# Patient Record
(demographics unavailable — no encounter records)

---

## 2025-06-13 NOTE — ASSESSMENT
[FreeTextEntry1] : Mr. Orta is a 44-year-old who presents with subacute progressive predominantly motor symptoms affecting the distal upper and lower extremities and very severe proximal lower extremities.  There is a paucity of sensory findings and complaints.  He is areflexic.  His clinical presentation, neurologic examination and enhancement of the distal conus and ventral lumbar nerve root are consistent with an immune mediated polyneuropathy.  Review of his progressive symptoms over several months, this is more likely a chronic and acute inflammatory polyneuropathy.  He has noted no significant improvement since receiving IVIG approximately a month ago.  I suggested that he undergo updated EMG and nerve conduction studies.  This will be followed by CSF analysis.  He will undergo immunoglobulin studies including quantitative immunoglobulins, serum protein electrophoresis and serum and urine immunofixation.  Ganglioside, anti-MAG, contactin 1 and neurofascin antibodies will be obtained.  He may require CT imaging of the chest, abdomen and pelvis based upon the above results.  He would benefit from physical therapy.  Further management will depend upon these results and his clinical course.

## 2025-06-13 NOTE — HISTORY OF PRESENT ILLNESS
[FreeTextEntry1] : Mr. Lee Orta is a 44-year-old right-handed gentleman who was referred for neurologic evaluation at the suggestion of Dr. Renée Cee.  He was accompanied by his brother, Britta Adin Orta, a [facility] transplant nephrologist.  Mr. Orta was well until early December 2024 while on vacation in [place].  He experienced a 24-hour bout of vomiting without diarrhea.  He recovered completely.  He was then well until late January 2025 when he awoke with severe left shoulder pain.  This resolved but then recurred the next night.  He subsequently began experiencing migratory twisting pains in his back and limbs.  He subsequently developed muscle discomfort.  In early February, he complained of stiffness in his legs and difficulty ambulating.  In late April, he noted the development of fasciculations in his lower extremities and occasionally in his upper extremities.  He became progressively weak requiring the use of a cane.  He was evaluated by Dr. Luis Daniel Hendrix, a rheumatologist.  There were mild transient elevations of CPK (580) and myoglobin.  The following studies were unremarkable, Myomarker Panel 3, hepatitis B and C, ANCA, CCP-IgG, vitamin B12, folate, and quant TB Gold, heavy metals, Mora, RNP, Lyme titer, Babesia and ehrlichiosis.  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  Genetic testing for dystrophinopathies including Duchenne and Pina muscular dystrophy was negative.  On May 9, 2025, he underwent electrodiagnostic testing in a physical therapy office.  There was mild slowing of motor conduction velocities.  Sensory potentials were normal with mild slowing.  The left tibial H reflex was nonreactive and the right was normal.  Electromyography revealed spontaneous activity in both lower extremities.  There were chronic motor unit changes in the right distal lower extremity.  These findings prompted admission to [facility].  MRI of the entire spine with and without contrast revealed enhancement of the conus and ventral nerve roots.  Lumbar puncture was not performed.  Paraneoplastic and ganglioside panels were negative.  He was administered IVIG for presumed acute motor axonal neuropathy or acute motor sensory axonal neuropathy beginning on May 17.  He reports persistent weakness of his lower extremities requiring the use of a cane.  He denies headaches, cognitive, visual, hearing, swallowing or sphincteric difficulties.  He has mild numbness in his feet.  He complains of occasional twitching of his lower extremity muscles and on rare occasions the uppers.  Past surgical history is unremarkable.  He suffers from hypertension, prediabetes and tachyarrhythmia.  There is no history of hyperlipidemia, coronary disease, congestive heart failure, pulmonary, renal, hepatic, gastrointestinal, thyroid, hematologic or cerebrovascular disease.  He has no allergies.  Medications include metoprolol 50 mg/day.  He is single and works as a .  He is a non-smoker and drinks a moderate amount of alcohol.  Family history is notable for mother with coronary disease and hyperlipidemia.  His father suffers from diabetes.  His brother is healthy.

## 2025-06-13 NOTE — PROCEDURE
[FreeTextEntry1] : Nerve conduction studies and electromyography [FreeTextEntry2] : Assess polyneuropathy [FreeTextEntry3] : Electrodiagnostic testing was performed in the right upper and both lower extremities.  The radial, median and ulnar sensory potentials and conduction velocities were normal.  The median and ulnar motor distal latencies and compound muscle action potentials were normal.  There was conduction block with proximal stimulation at Erb's point. The radial motor distal latency, compound muscle action potential and conduction velocity were normal.  The median F wave was normal.  The ulnar F wave was mildly prolonged.  The sural sensory potentials were normal.  The left sural conduction velocity was normal and the right was mildly slow.  The right superficial peroneal sensory potential conduction velocity were normal.  The right peroneal motor distal latency and compound muscle action potential were normal.  There was severe conduction block on proximal stimulation.  The right tibial motor distal latency was mildly prolonged and compound muscle action potential was normal.  Tibial conduction velocity was moderately slow.  The left peroneal motor distal latency was prolonged and the compound muscle action potential was low in amplitude.  There was conduction block on proximal stimulation and conduction velocity was very slow.  The left tibial motor distal latency was prolonged but the compound muscle action potential was normal.  Tibial conduction velocity was moderately slow.    The right peroneal and both tibial F waves were severely prolonged.  The left peroneal F wave was nonreactive.  The tibial H reflexes were nonreactive.  Needle electromyography was performed in several right upper and lower extremity, cervical and lumbar paraspinal muscles.  There were fasciculation potentials in the tibialis anterior, peroneus longus and medial gastrocnemius.  There were fibrillation potentials in the C6 paraspinal muscle and sharp waves in the peroneus longus and medial gastrocnemius.  There was chronic motor unit reinnervation in the peroneus longus and mild changes in the medial gastrocnemius.  There was reduced recruitment in the peroneus longus and extensor digitorum communis.  There was single unit recruitment in the medial gastrocnemius and vastus lateralis.  All other motor units and recruitment patterns were normal.  Conclusions: This was an abnormal study.  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  There was no electrophysiologic evidence of sensory polyneuropathy.  Clinical correlation: Today's study confirms the presence of a mixed demyelinating and axonal motor polyneuropathy.  I suggested CSF analysis.  I would suggest repeat treatment with IVIG with the option of adding prednisone if response is inadequate.  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

## 2025-06-13 NOTE — CONSULT LETTER
[Dear  ___] : Dear  [unfilled], [Consult Letter:] : I had the pleasure of evaluating your patient, [unfilled]. [Please see my note below.] : Please see my note below. [Consult Closing:] : Thank you very much for allowing me to participate in the care of this patient.  If you have any questions, please do not hesitate to contact me. [Sincerely,] : Sincerely, [DrBritta  ___] : Dr. MCKENZIE [FreeTextEntry3] : Mansoor Bynum M.D.

## 2025-06-13 NOTE — PHYSICAL EXAM
[FreeTextEntry1] : Constitutional:  Patient was well-developed, well-nourished and in no acute distress.   Head:  Normocephalic, atraumatic. Tympanic membranes were clear.   Neck:  Supple with full range of motion.   Cardiovascular:  Cardiac rhythm was regular without murmur. There were no carotid bruits. Peripheral pulses were full and symmetric.   Respiratory:  Lungs were clear.   Abdomen:  Soft and nontender.   Spine:  Nontender.  There was no pain on straight leg raising.  Skin:  There were no rashes.   NEUROLOGICAL EXAMINATION:  Mental Status: Patient was alert and oriented. Speech was fluent. There was no dysarthria.   Cranial Nerves:   II: Visual acuity was 20/ 20 bilaterally with glasses and near card. Pupils were equal and reactive. Visual fields were full. Funduscopic examination was normal.   III, IV, VI:  Eye movements were full without nystagmus.   V: Facial sensation was intact.   VII: Facial strength was normal.   VIII: Hearing was equal.   IX, X: Palatal movement was normal. Phonation was normal.   XI: Sternocleidomastoids and trapezii were normal.   XII: Tongue was midline and movements normal. There was no lingual atrophy or fasciculations.   Motor Examination: Muscle bulk and tone were normal throughout.  I noted no fasciculations.  He was unable to stand on his toes or heels.  He was unable to rise from a seated position without using his arms.  On manual testing however there was no weakness of the pelvic girdle or knees.  In the upper extremities, there was mild weakness of the hand intrinsic muscles and shoulder external rotators.  There was no scapular winging.  Sensory Examination: Pinprick and joint position sense were intact.  Vibration was mildly diminished in the toes compared to the knees.  Reflexes: DTRs were absent.  Plantar Responses: Plantar responses were flexor.   Coordination/Cerebellar Function: There was no dysmetria on finger to nose or heel to shin testing.   Gait/Stance: Gait was slow and unsteady.  Tandem and Romberg were not tested.

## 2025-07-14 NOTE — HISTORY OF PRESENT ILLNESS
[Denies] : Denies [Yes] : Yes [N/A] : N/A [Informed consent documented in EHR.] : Informed consent documented in EHR. [Right upper extremity] : Right upper extremity [24g] : 24g [IV discontinued. Intact. No signs or symptoms of IV complications noted. Time: ___] : IV discontinued. Intact. No signs or symptoms of IV complications noted. Time: [unfilled] [Patient  instructed to seek medical attention with signs and symptoms of adverse effects] : Patient  instructed to seek medical attention with signs and symptoms of adverse effects [Patient left unit in no acute distress] : Patient left unit in no acute distress [Medications administered as ordered and tolerated well.] : Medications administered as ordered and tolerated well. [de-identified] : Cane [de-identified] : FA [de-identified] : 0434 [de-identified] : Generic: Immunoglobulin Billing unit 500mg Dx: Chronic inflammatory demyelinating polyneuropathy (G61.81) Total Dose Administered: 40g                        Start: 0943                       Stop: 1223 Amount of drug waste: n/a Infusion Rate:  0854  Pre-infusion - 139/87, 78, 17 1014  40mL/hr - 124/85, 61, 17 1044  80mL/hr - 121/82, 61, 16 1118  160mL/hr - 114/78, 64, 17 1149  320mL/hr - 123/79, 66, 16 1255  Observation - 129/81, 72, 18 NDC# 9758-8840-36                Lot# [id number]                Exp: 2/4/28 NDC# 4191-1064-84                Lot# [id number]                 Exp: 10/24/27  39132, 27154 Buy and Bill: Y Pre-Medication: Acetaminophen 650mg          Route: PO                       Time Given: 0910 Diphenhydramine 25mg       Route: PO                        Time Given: 0910 Hydration: 500mL 	 97125 Was there a treatment reaction? n/a Action taken: n/a Next Appointment: 7/16/25

## 2025-07-17 NOTE — HISTORY OF PRESENT ILLNESS
[Right upper extremity] : Right upper extremity [22g] : 22g [Start Time: ___] : Medication Start Time: [unfilled] [End Time: ___] : Medication End Time: [unfilled] [Medication Name: ___] : Medication Name: [unfilled] [Total Amount Administered: ___] : Total Amount Administered: [unfilled] [IV discontinued. Intact. No signs or symptoms of IV complications noted. Time: ___] : IV discontinued. Intact. No signs or symptoms of IV complications noted. Time: [unfilled] [Patient  instructed to seek medical attention with signs and symptoms of adverse effects] : Patient  instructed to seek medical attention with signs and symptoms of adverse effects [Patient left unit in no acute distress] : Patient left unit in no acute distress [Medications administered as ordered and tolerated well.] : Medications administered as ordered and tolerated well. [de-identified] : hand [de-identified] : 9:15 [de-identified] : Generic: Immunoglobulin  Billing Unit 500mg   Dx: Chronic inflammatory demyelinating polyneuropathy (G61.81)   Provider treatment prescriber: 	MARK SILVA   Total Dose administered:  40g                                             Start: 9:51                 Stop: 12"28 Amount of drug waste: none  IV insertion time:    9:15                                          IV d/c time: 12:30   infusion Rate:     weight-based titration 40mL/hr x 30 mins, 80mL/hr x 30mins, 160mL/hr x 30 mins, 320mL/hr , max=400ml/hr   NDC#: 6533-6116-81      Lot#: [id number]                Exp: 2/17/2028 NDC#: 1900-6394-81      Lot#: [id number]                Exp: 2/17/2028 (20g vials x2)    87656, 34721   Buy and bill: Yes   Pre-Medication   Acetaminophen     route: PO            Total dose administered:    650mg                                 Time given: 9:18  Diphenhydramine      route: PO Total dose administered:          50mg                           Time given: 9:18    Hydration:  total amount:  500 mL               Time given: 9:19     VS prior to infusion @ 9:09- HR: 68 BP: 129/82 RR: 18 Infusion began @ 9:51 (40 ml/hr) VS @ 10:23- HR: 67 BP: 116/77 RR: 18 (rate inc to 80ml/hr) VS @ 10:53- HR: 62 BP: 118/79 RR: 16 (rate inc to 160ml/hr) VS @ 11:25- HR: 61 BP: 117/79 RR: 18 (rate inc to 240ml/hr) VS @ 11:57- HR: 60 BP: 114/76 RR: 17 (rate inc to 32 ml/hr) VS @ 12:28- HR: 61 BP: 118/79 RR: 18 (infusion completed)  Was there a treatment reaction? no Action Taken: N/A   Patient declined to stay for observation after infusion completed. He was informed that 30 mins of observation is recommended per our protocol. Patient was educated on risks vs benefits and verbalized an understanding of s/s of delayed infusion reaction.  Next Appointment: tomorrow 7/17 @ 9:00am

## 2025-07-17 NOTE — HISTORY OF PRESENT ILLNESS
[Right upper extremity] : Right upper extremity [22g] : 22g [Start Time: ___] : Medication Start Time: [unfilled] [End Time: ___] : Medication End Time: [unfilled] [Medication Name: ___] : Medication Name: [unfilled] [Total Amount Administered: ___] : Total Amount Administered: [unfilled] [IV discontinued. Intact. No signs or symptoms of IV complications noted. Time: ___] : IV discontinued. Intact. No signs or symptoms of IV complications noted. Time: [unfilled] [Patient  instructed to seek medical attention with signs and symptoms of adverse effects] : Patient  instructed to seek medical attention with signs and symptoms of adverse effects [Patient left unit in no acute distress] : Patient left unit in no acute distress [Medications administered as ordered and tolerated well.] : Medications administered as ordered and tolerated well. [de-identified] : hand [de-identified] : 9:15 [de-identified] : Generic: Immunoglobulin  Billing Unit 500mg   Dx: Chronic inflammatory demyelinating polyneuropathy (G61.81)   Provider treatment prescriber: 	MARK SILVA   Total Dose administered:  40g                                             Start: 9:51                 Stop: 12"28 Amount of drug waste: none  IV insertion time:    9:15                                          IV d/c time: 12:30   infusion Rate:     weight-based titration 40mL/hr x 30 mins, 80mL/hr x 30mins, 160mL/hr x 30 mins, 320mL/hr , max=400ml/hr   NDC#: 7548-9942-67      Lot#: [id number]                Exp: 2/17/2028 NDC#: 9237-3671-73      Lot#: [id number]                Exp: 2/17/2028 (20g vials x2)    22648, 67467   Buy and bill: Yes   Pre-Medication   Acetaminophen     route: PO            Total dose administered:    650mg                                 Time given: 9:18  Diphenhydramine      route: PO Total dose administered:          50mg                           Time given: 9:18    Hydration:  total amount:  500 mL               Time given: 9:19     VS prior to infusion @ 9:09- HR: 68 BP: 129/82 RR: 18 Infusion began @ 9:51 (40 ml/hr) VS @ 10:23- HR: 67 BP: 116/77 RR: 18 (rate inc to 80ml/hr) VS @ 10:53- HR: 62 BP: 118/79 RR: 16 (rate inc to 160ml/hr) VS @ 11:25- HR: 61 BP: 117/79 RR: 18 (rate inc to 240ml/hr) VS @ 11:57- HR: 60 BP: 114/76 RR: 17 (rate inc to 32 ml/hr) VS @ 12:28- HR: 61 BP: 118/79 RR: 18 (infusion completed)  Was there a treatment reaction? no Action Taken: N/A   Patient declined to stay for observation after infusion completed. He was informed that 30 mins of observation is recommended per our protocol. Patient was educated on risks vs benefits and verbalized an understanding of s/s of delayed infusion reaction.  Next Appointment: tomorrow 7/17 @ 9:00am

## 2025-07-17 NOTE — HISTORY OF PRESENT ILLNESS
[Right upper extremity] : Right upper extremity [22g] : 22g [Start Time: ___] : Medication Start Time: [unfilled] [End Time: ___] : Medication End Time: [unfilled] [Medication Name: ___] : Medication Name: [unfilled] [Total Amount Administered: ___] : Total Amount Administered: [unfilled] [IV discontinued. Intact. No signs or symptoms of IV complications noted. Time: ___] : IV discontinued. Intact. No signs or symptoms of IV complications noted. Time: [unfilled] [Patient  instructed to seek medical attention with signs and symptoms of adverse effects] : Patient  instructed to seek medical attention with signs and symptoms of adverse effects [Patient left unit in no acute distress] : Patient left unit in no acute distress [Medications administered as ordered and tolerated well.] : Medications administered as ordered and tolerated well. [de-identified] : AC [de-identified] : 9:02 [de-identified] : Generic: Immunoglobulin  Billing Unit 500mg   Dx: Chronic inflammatory demyelinating polyneuropathy (G61.81)   Provider treatment prescriber: MARK SILVA  Total Dose administered:  40g                                             Start:  9:48              Stop: 12:39 Amount of drug waste: none  IV insertion time:   9:02                                           IV d/c time: 12:41   infusion Rate:     weight-based titration 40mL/hr x 30 mins, 80mL/hr x 30mins, 160mL/hr x 30 mins, 320mL/hr , max=400ml/hr   NDC#: 4255-0706-74      Lot#: [id number]                Exp: 10/23/2027 NDC#: 9025-4975-95      Lot#:  [id number]               Exp: 9/16/2027 NDC#: 3839-2071-27      Lot#:  [id number]               Exp: 2/18/2028 (5g vials x2), 30g vial x1)    93840, 29743   Buy and bill: Yes   Pre-Medication   Acetaminophen     route: PO           Total dose administered:    650mg                                   Time given: 9:05    Diphenhydramine      route:  PO Total dose administered:      25mg                               Time given: 9:05    Hydration:  total amount:  500  mL               Time given: 9:06     VS prior to infusion @ 8:56- HR: 65 BP: 118/77 RR: 18 Infusion began @ 9:48 (40 ml/hr) VS @ 10:21- HR: 56 BP: 110/72 RR: 18 (rate inc to 80 ml/hr) VS @ 10:54- HR: 61 BP: 121/83 RR: 18 (rate inc to 160 ml/hr) VS @11:24- HR: 61 BP: 111/70 RR: 16 (rate inc to 240 ml/hr) VS @ 11:55- HR: 62 BP: 114/76 RR: 16 (infusion completed)  Was there a treatment reaction? no Action Taken: N/A  Patient declined to stay for observation after infusion completed. He was informed that 30 mins of observation is recommended per our protocol. Patient was educated on risks vs benefits and verbalized an understanding of s/s of delayed infusion reaction.   Next Appointment:  tomorrow 7/18 @ 8:00am

## 2025-07-23 NOTE — HISTORY OF PRESENT ILLNESS
[N/A] : N/A [Denies] : Denies [Yes] : Yes [Informed consent documented in EHR.] : Informed consent documented in EHR. [Right upper extremity] : Right upper extremity [24g] : 24g [IV discontinued. Intact. No signs or symptoms of IV complications noted. Time: ___] : IV discontinued. Intact. No signs or symptoms of IV complications noted. Time: [unfilled] [Patient  instructed to seek medical attention with signs and symptoms of adverse effects] : Patient  instructed to seek medical attention with signs and symptoms of adverse effects [Patient left unit in no acute distress] : Patient left unit in no acute distress [Medications administered as ordered and tolerated well.] : Medications administered as ordered and tolerated well. [de-identified] : Cane, unsteady gait  [de-identified] : Radial [de-identified] : 3387 [de-identified] : Generic: Immunoglobulin Billing unit 500mg Dx: Chronic inflammatory demyelinating polyneuropathy (G61.81) Total Dose Administered: 40g                        Start: 0904                      Stop: 1149 Amount of drug waste: n/a Infusion Rate:  0811  Pre-infusion - 130/83, 70, 17 0930  40mL/hr - 111/74, 65, 16 1002  80mL/hr - 120/77, 66, 16 1035  160mL/hr - 114/75, 63, 17 1108  240mL/hr - 113/71, 63, 17 1149  Complete - 109/70, 68, 16 NDC# 8711-2267-53                Lot# [id number]                  Exp: 2/18/28 NDC# 8638-0086-05                Lot# [id number] / [id number]                 Exp: 9/16/27 / 9/11/27  96039, 25361 Buy and Bill: Y Pre-Medication: Acetaminophen 650mg          Route: PO                        Time Given: 0827 Diphenhydramine 25mg       Route: PO                       Time Given: 0827 Hydration: 500mL 	 45519 Was there a treatment reaction? n/a Action taken: n/a Next Appointment: 8/6/25 at [phone] and 8/7/25 at [phone]. Tiki matthewsed.

## 2025-07-23 NOTE — HISTORY OF PRESENT ILLNESS
[N/A] : N/A [Denies] : Denies [Yes] : Yes [Informed consent documented in EHR.] : Informed consent documented in EHR. [Right upper extremity] : Right upper extremity [24g] : 24g [IV discontinued. Intact. No signs or symptoms of IV complications noted. Time: ___] : IV discontinued. Intact. No signs or symptoms of IV complications noted. Time: [unfilled] [Patient  instructed to seek medical attention with signs and symptoms of adverse effects] : Patient  instructed to seek medical attention with signs and symptoms of adverse effects [Patient left unit in no acute distress] : Patient left unit in no acute distress [Medications administered as ordered and tolerated well.] : Medications administered as ordered and tolerated well. [de-identified] : Cane, unsteady gait  [de-identified] : Radial [de-identified] : 4638 [de-identified] : Generic: Immunoglobulin Billing unit 500mg Dx: Chronic inflammatory demyelinating polyneuropathy (G61.81) Total Dose Administered: 40g                        Start: 0904                      Stop: 1149 Amount of drug waste: n/a Infusion Rate:  0811  Pre-infusion - 130/83, 70, 17 0930  40mL/hr - 111/74, 65, 16 1002  80mL/hr - 120/77, 66, 16 1035  160mL/hr - 114/75, 63, 17 1108  240mL/hr - 113/71, 63, 17 1149  Complete - 109/70, 68, 16 NDC# 5350-1321-57                Lot# [id number]                  Exp: 2/18/28 NDC# 8237-3068-94                Lot# [id number] / [id number]                 Exp: 9/16/27 / 9/11/27  81947, 41723 Buy and Bill: Y Pre-Medication: Acetaminophen 650mg          Route: PO                        Time Given: 0827 Diphenhydramine 25mg       Route: PO                       Time Given: 0827 Hydration: 500mL 	 40655 Was there a treatment reaction? n/a Action taken: n/a Next Appointment: 8/6/25 at 4037-9818 and 8/7/25 at [phone]. Tiki matthewsed.